# Patient Record
(demographics unavailable — no encounter records)

---

## 2019-04-14 NOTE — EDM.PDOC
ED HPI GENERAL MEDICAL PROBLEM





- General


Chief Complaint: Head Injury


Stated Complaint: Head injury, laceration, intoxication


Time Seen by Provider: 04/14/19 04:06


Source of Information: Reports: Patient, EMS Notes Reviewed, RN, RN Notes 

Reviewed


History Limitations: Reports: No Limitations





- History of Present Illness


INITIAL COMMENTS - FREE TEXT/NARRATIVE: 


Patient is brought to the ED at [facility] for the evaluation of a 

posterior scalp laceration. Patient hit his head on a brick wall. According to 

EMS, the patient had been drinking alcohol when the injury occurred. Witnesses 

do not think he had any LOC. Patient apparently lost his balance and hit the 

back of his head on a cement wall in his dorm room. 


Patient denies any headache. No neck pain or back pain. Patient remembers the 

entire injury. No visual field disturbances. No previous injury or trauma. 

Patient denies any numbness, tingling, or paresthesias.


Onset: Today, Sudden


Onset Date: 04/14/19





- Related Data


 Allergies











Allergy/AdvReac Type Severity Reaction Status Date / Time


 


No Known Allergies Allergy   Verified 04/14/19 03:51











Home Meds: 


 Home Meds





. [No Known Home Meds]  04/14/19 [History]











ED ROS GENERAL





- Review of Systems


Review Of Systems: See Below


Constitutional: Denies: Fever, Chills


HEENT: Denies: Vision Change


Respiratory: Denies: Shortness of Breath, Cough


Cardiovascular: Denies: Chest Pain, Palpitations


Musculoskeletal: Denies: Neck Pain, Back Pain


Skin: Reports: Wound


Neurological: Reports: No Symptoms





ED EXAM, HEAD INJURY





- Physical Exam


Exam: See Below


Exam Limited By: No Limitations


General Appearance: Alert, No Apparent Distress


Head: Normocephalic, Scalp Lacerations


Nexus Criteria: Evidence of Intoxication.  No: Posterior, Midline Cervical 

Tenderness, Altered Level of Consciousness, Focal Neurological Deficit


Eyes: Bilateral Eye: EOMI, Normal Inspection, PERRL


Ears: Normal External Exam, Normal Canal, Normal TMs


Nose: Normal Inspection, No Blood


Throat/Mouth: Normal Inspection, Normal Oropharynx, No Airway Compromise


Neck: Non-Tender, Full Range of Motion, Normal Alignment


Respiratory: No Respiratory Distress, Lungs Clear, Normal Breath Sounds


Cardiovascular: Normal Peripheral Pulses, Regular Rate, Rhythm


Back Exam: Normal Inspection


Neurologic: Alert, Oriented x 3


Skin: Other (2cm horizontal laceration posterior scalp; low grade venous ooze)





- [place] Coma Score


Best Eye Response (Monika): (4) Open Spontaneously


Best Verbal Response (Monika): (5) Oriented


Best Motor Response (Monika): (6) Obeys Commands


[place] Total: 15





ED LACERATION/WOUND & AVA PROC





- Laceration/Wound Repair


  ** Posterior Head


Lac/wound length in cm: 2


Appearance: Subcutaneous


Anesthetic Type: Other (None)


Skin Prep: Chlorhexidine (Hibiciens), Saline


Exploration/Debridement/Repair: Wound Explored, In a Bloodless Field, Explored 

to Base, No Foreign Material Found


Closed with: Staples


# of Sutures: 5


Sterile Dressing Applied: None


Tetanus Status Addressed: Yes


Complications: No





Course





- Vital Signs


Last Recorded V/S: 


 Last Vital Signs











Temp  35.7 C   04/14/19 03:35


 


Pulse  69   04/14/19 03:35


 


Resp  16   04/14/19 03:35


 


BP  157/73 H  04/14/19 03:35


 


Pulse Ox  96   04/14/19 03:35














Departure





- Departure


Time of Disposition: 04:22


Disposition: Home, Self-Care 01


Condition: Good


Clinical Impression: 


 Closed head injury without loss of consciousness





Scalp laceration


Qualifiers:


 Encounter type: initial encounter Qualified Code(s): S01.01XA - Laceration 

without foreign body of scalp, initial encounter








- Discharge Information


*PRESCRIPTION DRUG MONITORING PROGRAM REVIEWED*: Not Applicable


*COPY OF PRESCRIPTION DRUG MONITORING REPORT IN PATIENT WILLIAN: Not Applicable


Instructions:  Stitches, Staples, or Adhesive Wound Closure, Sutured Wound Care


Forms:  ED Department Discharge


Additional Instructions: 


1. Stay well hydrated and rest


2. Stop drinking


3. Staples need to stay in for 10 days


4. See PCP in 10 days for staple removal





- Problem List Review


Problem List Initiated/Reviewed/Updated: Yes





- Assessment/Plan


Assessment:: 


Head laceration


Plan: 


Staples placed without problems. Tolerated well. Will need to stay in for 10 

days. Cleanse normally. See PCP for removal in 10 days.